# Patient Record
Sex: FEMALE | Race: OTHER | HISPANIC OR LATINO | URBAN - METROPOLITAN AREA
[De-identification: names, ages, dates, MRNs, and addresses within clinical notes are randomized per-mention and may not be internally consistent; named-entity substitution may affect disease eponyms.]

---
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
## 2025-02-24 NOTE — ED PROVIDER NOTE - PHYSICAL EXAMINATION
Problem: Heart Failure  Goal: Optimal Functional Ability  Intervention: Optimize Functional Ability  Recent Flowsheet Documentation  Taken 2/24/2025 0822 by Ethan Waite RN  Activity Management:   ambulated in room   up in chair  Goal: Effective Oxygenation and Ventilation  Intervention: Promote Airway Secretion Clearance  Recent Flowsheet Documentation  Taken 2/24/2025 0822 by Ethan Waite RN  Activity Management:   ambulated in room   up in chair     Problem: Adult Inpatient Plan of Care  Goal: Optimal Comfort and Wellbeing  Intervention: Monitor Pain and Promote Comfort  Recent Flowsheet Documentation  Taken 2/24/2025 0822 by Ethan Waite RN  Pain Management Interventions: medication (see MAR)   Goal Outcome Evaluation:         PT was heavy assist of 2 this a.m.   PT improved throughout day.      Pt was confused to situation and time.   Pt wife was at bedside this afternoon     Pt is waiting for TCU Placement.                 Physical Examination:   Gen: NAD, AOx3  Head: NCAT  HEENT: EOMI, oral mucosa moist, normal conjunctiva, neck supple  Lung: no respiratory distress  CV:  Normal perfusion  Abd: soft, NT ND  MSK: No edema, no visible deformities  Neuro: No focal neurologic deficits  Skin: No rash   Psych: normal affect

## 2025-06-04 ENCOUNTER — NON-APPOINTMENT (OUTPATIENT)
Age: 20
End: 2025-06-04